# Patient Record
Sex: FEMALE | Race: WHITE | HISPANIC OR LATINO | URBAN - METROPOLITAN AREA
[De-identification: names, ages, dates, MRNs, and addresses within clinical notes are randomized per-mention and may not be internally consistent; named-entity substitution may affect disease eponyms.]

---

## 2024-04-15 ENCOUNTER — APPOINTMENT (RX ONLY)
Dept: URBAN - METROPOLITAN AREA CLINIC 15 | Facility: CLINIC | Age: 34
Setting detail: DERMATOLOGY
End: 2024-04-15

## 2024-04-15 VITALS — WEIGHT: 175 LBS | HEIGHT: 61 IN

## 2024-04-15 DIAGNOSIS — L60.1 ONYCHOLYSIS: ICD-10-CM

## 2024-04-15 DIAGNOSIS — L30.8 OTHER SPECIFIED DERMATITIS: ICD-10-CM

## 2024-04-15 DIAGNOSIS — D22 MELANOCYTIC NEVI: ICD-10-CM

## 2024-04-15 PROBLEM — D22.71 MELANOCYTIC NEVI OF RIGHT LOWER LIMB, INCLUDING HIP: Status: ACTIVE | Noted: 2024-04-15

## 2024-04-15 PROCEDURE — 99203 OFFICE O/P NEW LOW 30 MIN: CPT

## 2024-04-15 PROCEDURE — ? PRESCRIPTION

## 2024-04-15 PROCEDURE — ? OBSERVATION

## 2024-04-15 PROCEDURE — ? COUNSELING

## 2024-04-15 RX ORDER — MOMETASONE FUROATE 1 MG/G
1 CREAM TOPICAL BID
Qty: 45 | Refills: 3 | Status: ERX | COMMUNITY
Start: 2024-04-15

## 2024-04-15 RX ADMIN — MOMETASONE FUROATE 1: 1 CREAM TOPICAL at 00:00

## 2024-04-15 ASSESSMENT — LOCATION DETAILED DESCRIPTION DERM
LOCATION DETAILED: RIGHT SMALL FINGERNAIL
LOCATION DETAILED: RIGHT RADIAL PALM
LOCATION DETAILED: LEFT RADIAL PALM
LOCATION DETAILED: RIGHT LATERAL PLANTAR FOOT
LOCATION DETAILED: LEFT RADIAL DORSAL HAND
LOCATION DETAILED: RIGHT RADIAL DORSAL HAND

## 2024-04-15 ASSESSMENT — LOCATION SIMPLE DESCRIPTION DERM
LOCATION SIMPLE: RIGHT SMALL FINGERNAIL
LOCATION SIMPLE: RIGHT FOOT
LOCATION SIMPLE: RIGHT HAND
LOCATION SIMPLE: LEFT HAND

## 2024-04-15 ASSESSMENT — LOCATION ZONE DERM
LOCATION ZONE: HAND
LOCATION ZONE: FINGERNAIL
LOCATION ZONE: FEET

## 2024-04-15 NOTE — HPI: SKIN LESION
How Severe Is Your Skin Lesion?: mild
Has Your Skin Lesion Been Treated?: not been treated
Is This A New Presentation, Or A Follow-Up?: Skin Lesion
Additional History: The lesion was biopsied and lesion was benign

## 2024-04-15 NOTE — PROCEDURE: REASSURANCE
Additional Notes (Optional): We recommend to soak nail in white vinegar
Hide Additional Notes?: No
Detail Level: Generalized

## 2024-04-15 NOTE — PROCEDURE: COUNSELING
Detail Level: Detailed
Detail Level: Zone
Moisturizer Recommendations: Cerave hand cream
Topical Steroid Recommendations: Betamethasone ointment